# Patient Record
Sex: MALE | Race: WHITE | NOT HISPANIC OR LATINO | Employment: OTHER | URBAN - METROPOLITAN AREA
[De-identification: names, ages, dates, MRNs, and addresses within clinical notes are randomized per-mention and may not be internally consistent; named-entity substitution may affect disease eponyms.]

---

## 2024-05-30 ENCOUNTER — OFFICE VISIT (OUTPATIENT)
Dept: RADIATION ONCOLOGY | Facility: HOSPITAL | Age: 74
End: 2024-05-30
Payer: MEDICARE

## 2024-05-30 ENCOUNTER — TELEPHONE (OUTPATIENT)
Dept: RADIATION ONCOLOGY | Facility: HOSPITAL | Age: 74
End: 2024-05-30
Payer: MEDICARE

## 2024-05-30 ENCOUNTER — HOSPITAL ENCOUNTER (OUTPATIENT)
Dept: RADIATION ONCOLOGY | Facility: HOSPITAL | Age: 74
Setting detail: RADIATION/ONCOLOGY SERIES
Discharge: HOME | End: 2024-05-30
Payer: MEDICARE

## 2024-05-30 VITALS
TEMPERATURE: 97.5 F | WEIGHT: 245.7 LBS | RESPIRATION RATE: 16 BRPM | OXYGEN SATURATION: 96 % | HEIGHT: 73 IN | SYSTOLIC BLOOD PRESSURE: 146 MMHG | DIASTOLIC BLOOD PRESSURE: 76 MMHG | BODY MASS INDEX: 32.56 KG/M2 | HEART RATE: 54 BPM

## 2024-05-30 DIAGNOSIS — C61 PROSTATE CANCER: Primary | ICD-10-CM

## 2024-05-30 RX ORDER — PERPHENAZINE/AMITRIPTYLINE HCL 4 MG-25 MG
40 TABLET ORAL DAILY
COMMUNITY

## 2024-05-30 RX ORDER — CHLORAL HYDRATE 500 MG
950 CAPSULE ORAL
COMMUNITY

## 2024-05-30 RX ORDER — AMLODIPINE BESYLATE 5 MG/1
1 TABLET ORAL DAILY
COMMUNITY

## 2024-05-30 RX ORDER — PHYTONADIONE 5 MG/1
50 TABLET ORAL ONCE
COMMUNITY

## 2024-05-30 RX ORDER — SODIUM PHOSPHATE,MONO-DIBASIC 19G-7G/118
ENEMA (ML) RECTAL 2 TIMES DAILY
COMMUNITY

## 2024-05-30 RX ORDER — HYDROCHLOROTHIAZIDE 25 MG/1
0.5 TABLET ORAL DAILY
COMMUNITY
Start: 2024-04-22

## 2024-05-30 RX ORDER — CARVEDILOL 6.25 MG/1
0.5 TABLET ORAL 2 TIMES DAILY
COMMUNITY

## 2024-05-30 RX ORDER — ESCITALOPRAM OXALATE 5 MG/1
1 TABLET ORAL DAILY
COMMUNITY
Start: 2024-02-12

## 2024-05-30 RX ORDER — LISINOPRIL 20 MG/1
TABLET ORAL
COMMUNITY
Start: 2024-04-03

## 2024-05-30 RX ORDER — SIMVASTATIN 20 MG
1 TABLET ORAL DAILY
COMMUNITY

## 2024-05-30 RX ORDER — UBIDECARENONE 100 MG
400 CAPSULE ORAL DAILY
COMMUNITY

## 2024-05-30 RX ORDER — OMEPRAZOLE 20 MG/1
20 CAPSULE, DELAYED RELEASE ORAL DAILY
COMMUNITY

## 2024-05-30 RX ORDER — MULTIVIT WITH MINERALS/LUTEIN
500 TABLET ORAL DAILY
COMMUNITY

## 2024-05-30 RX ORDER — VITAMIN B COMPLEX
CAPSULE ORAL DAILY
COMMUNITY

## 2024-05-30 RX ORDER — ASPIRIN 81 MG/1
81 TABLET ORAL DAILY
COMMUNITY

## 2024-05-30 NOTE — TELEPHONE ENCOUNTER
Dr. Juárez's office notified that patient will need to return to Dr. Juárez to be set up for long coarse radiation and ADT.

## 2024-05-30 NOTE — PROGRESS NOTES
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 Juárez.           BRIEF HISTORY:  Richard Gonsales  is a very pleasant 73 y.o. male with a history of a rising PSA and previous biopsy consistent with [place] 6 disease who had been on active surveillance.  The patient had a PSA 2023 that showed a value of 5.59. This increased to 8.28 on 24.   The patient went for a biopsy that showed a 2 cm PIRADS 4 lesion with no obvious SCOT, but potential extension to the seminal vesicles. The patient had a biopsy that showed jr 8 in multiple cores with 100 % involvement.  The patient's pathology was sent to [facility] for review and in the meantime the patient had a GPS score that showed a score of 39.  At [place] the patient's pathology was confirmed with Jr 4+4 = 8 with 100% disease involvement in 2 cores and 4+3 in another.    The patient was referred to our clinic to consider CyberKnife SBRT.      Patient ports that his urinary function is good he has no major issues does have a little bit more prominent urgency.    The patient reports that he discussed surgery and long course radiotherapy with Dr. Juárez previously.        BMI:  Body mass index is 32.42 kg/m².      Social History     Substance and Sexual Activity   Alcohol Use Not Currently       No Known Allergies    Social History     Tobacco Use    Smoking status: Former     Types: Cigarettes    Smokeless tobacco: Former   Substance Use Topics    Alcohol use: Not Currently         Past Medical History:   Diagnosis Date    Arthritis     Hypertension     Prostate cancer        family history includes Kidney  "cancer in his brother; Throat cancer in his brother.     Past Surgical History:   Procedure Laterality Date    COLONOSCOPY      OTHER SURGICAL HISTORY      plate in right wrist    OTHER SURGICAL HISTORY      polyp removal from vocal cords        Review of Systems   Genitourinary:  Positive for frequency.     A full 14 point review of systems was performed and was negative except as noted in the HPI.         Objective   VITAL SIGNS:   Vitals:    05/30/24 1107   BP: 146/76   Pulse: 54   Resp: 16   Temp: 97.5 °F (36.4 °C)   TempSrc: Temporal   SpO2: 96%   Weight: 111 kg (245 lb 11.2 oz)   Height: 185.4 cm (73\")   PainSc: 0-No pain      IPSS Questionnaire (AUA-7):  Over the past month…    1)  Incomplete Emptying  How often have you had a sensation of not emptying your bladder?  1 - Less than 1 time in 5   2)  Frequency  How often have you had to urinate less than every two hours? 2 - Less than half the time   3)  Intermittency  How often have you found you stopped and started again several times when you urinated?  1 - Less than 1 time in 5   4) Urgency  How often have you found it difficult to postpone urination?  3 - About half the time   5) Weak Stream  How often have you had a weak urinary stream?  1 - Less than 1 time in 5   6) Straining  How often have you had to push or strain to begin urination?  1 - Less than 1 time in 5   7) Nocturia  How many times did you typically get up at night to urinate?  2 - 2 times   Total Score:  11       Quality of life due to urinary symptoms:  If you were to spend the rest of your life with your urinary condition the way it is now, how would you feel about that? 1-Pleased   Urine Leakage (Incontinence) 0-No Leakage     Sexual Health Inventory  Current Status    1)  How do you rate your confidence that you could achieve and keep an erection? 1-Very Low   2) When you had erections with sexual stimulation, how often were your erections hard enough for penetration (entering your " partner)? 1-Almost never or never   3)  During sexual intercourse, how often were you able to maintain your erection after you had penetrated (entered) into your partner? 1-Almost never or never   4) During sexual intercourse, how difficult was it to maintain your erection to completion of intercourse? 1-Extremely difficult   5) When you attempted sexual intercourse, how often was it satisfactory to you? 1-Almost never or never   Total Score: 5       Bowel Health Inventory  Current Status: 0-No problems, no rectal bleeding, no discharge, less then 5 bowel movements a day        KPS       90%    Physical Exam  Constitutional:       General: He is not in acute distress.     Appearance: He is well-developed.   HENT:      Head: Normocephalic and atraumatic.   Eyes:      Conjunctiva/sclera: Conjunctivae normal.      Pupils: Pupils are equal, round, and reactive to light.   Neck:      Trachea: No tracheal deviation.   Pulmonary:      Effort: Pulmonary effort is normal. No respiratory distress.      Breath sounds: No wheezing.   Abdominal:      General: There is no distension.      Palpations: Abdomen is soft.   Genitourinary:     Comments: Previously performed by Dr. Juárez.  Document the chart.  Deferred today.  Musculoskeletal:         General: Normal range of motion.      Cervical back: Normal range of motion.   Skin:     General: Skin is warm and dry.   Neurological:      Mental Status: He is alert.      Cranial Nerves: No cranial nerve deficit.      Coordination: Coordination normal.   Psychiatric:         Behavior: Behavior normal.         Judgment: Judgment normal.              The following portions of the patient's history were reviewed and updated as appropriate: allergies, current medications, past family history, past medical history, past social history, past surgical history, and problem list.  I have personally requested reviewed and interpreted the patient's images and radiology reports and pathology reports  listed below:  I reviewed the patient's previous PSAs  Reviewed the patient's MRI.  I reviewed the patient's biopsy report at [place].  Also reviewed the patient's biopsy report at [facility], both of the biopsy report showed [place] 4+4 = 8 disease involving 100% of cores.  The patient did have 1 core downgraded to [facility] of 4+3 equal 7.  I reviewed the patient's GPS score.    Assessment      IMPRESSION:     Patient is a very pleasant 73-year-old gentleman with high risk adenocarcinoma of the prostate [place] 4+4 = 8 in with 100% disease involving multiple cores.  The patient's error pretreatment PSA is 8.28.  Patient GPS score is 39.  Patient has had an MRI scan that showed no obvious evidence of extracapsular extension however does have a broad-based attachment posteriorly at the base of the prostate.  On my review of the MRI scans particularly the sagittal series it does appear that the patient's disease is extending into the base of the seminal vesicles.  Due to this I would state the patient has very early stage T3b N0 M0 disease.  Because that the patient would likely benefit from a longer course of hormonal therapy and long course radiotherapy to effectively target the seminal vesicles.  I reviewed with the patient today that his MRI scan did not directly call out the seminal vesicle invasion and that that is just my opinion but I did go over the images with him in detail in person today.    I offered to treat the patient with 5 fractions if he needed it for social reasons however the patient lives a very close to [place] and would be relatively straightforward matter for him to receive the treatments there.  I think and he would likely be better served with a longer course of radiotherapy.  Will send the patient back to Dr. Juárez for referral to [place] for radiotherapy.    Greater than 1 hour was spent preparing for and coordinating this visit. >50% of the time was spent in direct face to face  conversation with the patient teaching, answering question, and providing explanations regarding the patient's case.  The decision to treat the patient with radiation is a complex one and carries the risk of long-term side effects and complications.  The patient's malignancy represents a complicated life threatening condition that requires complex multidisciplinary management for treatment and followup.      RECOMMENDATIONS:      High risk prostate cancer  -Previously on active surveillance for low risk disease  -jr 4+4 = 8 at [place] and [facility]  -Multiple cores at 100% disease involvement  -67 cc gland  -Pretreatment PSA 8.28  -GPS score 39  -MRI scan appears to show early extension into the seminal vesicles particular on the sagittal series  -Concerning for early T3b disease  -Would recommend fiducial markers and space oaR with Dr. Juárez  -Would recommend long-term ADT discussed this with the patient today  -Would recommend long course radiation in [place] to target seminal vesicles as well as prostate plus or minus elective lymph nodes  -We did discuss 28 fraction regimen with the patient here but be much easier for him to get his treatments in [place].               Thony Langford MD        Errors in dictation may reflect use of voice recognition software and not all errors in transcription may have been detected prior to signing.

## 2024-05-31 PROBLEM — C61 PROSTATE CANCER: Status: ACTIVE | Noted: 2024-05-31
